# Patient Record
Sex: MALE | Race: WHITE | Employment: FULL TIME
[De-identification: names, ages, dates, MRNs, and addresses within clinical notes are randomized per-mention and may not be internally consistent; named-entity substitution may affect disease eponyms.]

---

## 2018-01-26 ENCOUNTER — TELEPHONE (OUTPATIENT)
Dept: CASE MANAGEMENT | Age: 53
End: 2018-01-26

## 2022-02-24 ENCOUNTER — OFFICE VISIT (OUTPATIENT)
Dept: FAMILY MEDICINE CLINIC | Age: 57
End: 2022-02-24
Payer: MEDICARE

## 2022-02-24 VITALS
SYSTOLIC BLOOD PRESSURE: 118 MMHG | DIASTOLIC BLOOD PRESSURE: 78 MMHG | OXYGEN SATURATION: 97 % | WEIGHT: 249 LBS | HEIGHT: 73 IN | BODY MASS INDEX: 33 KG/M2 | HEART RATE: 75 BPM

## 2022-02-24 DIAGNOSIS — Z79.899 MEDICAL MARIJUANA USE: ICD-10-CM

## 2022-02-24 DIAGNOSIS — Z13.220 SCREENING, LIPID: ICD-10-CM

## 2022-02-24 DIAGNOSIS — Z13.29 SCREENING FOR THYROID DISORDER: ICD-10-CM

## 2022-02-24 DIAGNOSIS — Z91.14 CONTROLLED SUBSTANCE AGREEMENT BROKEN: ICD-10-CM

## 2022-02-24 DIAGNOSIS — E78.5 HYPERLIPIDEMIA, UNSPECIFIED HYPERLIPIDEMIA TYPE: ICD-10-CM

## 2022-02-24 DIAGNOSIS — Z76.89 ENCOUNTER TO ESTABLISH CARE: Primary | ICD-10-CM

## 2022-02-24 DIAGNOSIS — J44.9 CHRONIC OBSTRUCTIVE PULMONARY DISEASE, UNSPECIFIED COPD TYPE (HCC): ICD-10-CM

## 2022-02-24 DIAGNOSIS — C34.12 PRIMARY CANCER OF LEFT UPPER LOBE OF LUNG (HCC): ICD-10-CM

## 2022-02-24 DIAGNOSIS — F41.8 MIXED ANXIETY AND DEPRESSIVE DISORDER: ICD-10-CM

## 2022-02-24 DIAGNOSIS — R35.1 NOCTURIA: ICD-10-CM

## 2022-02-24 DIAGNOSIS — Z12.11 ENCOUNTER FOR SCREENING FOR MALIGNANT NEOPLASM OF COLON: ICD-10-CM

## 2022-02-24 DIAGNOSIS — I10 ESSENTIAL HYPERTENSION: ICD-10-CM

## 2022-02-24 DIAGNOSIS — C34.32 PRIMARY MALIGNANT NEOPLASM OF LEFT LOWER LOBE OF LUNG (HCC): ICD-10-CM

## 2022-02-24 PROBLEM — R06.02 SOB (SHORTNESS OF BREATH): Status: ACTIVE | Noted: 2017-12-11

## 2022-02-24 PROBLEM — R06.83 SNORING: Status: ACTIVE | Noted: 2020-07-08

## 2022-02-24 PROBLEM — D49.1 NEOPLASM OF LUNG: Status: ACTIVE | Noted: 2020-03-13

## 2022-02-24 PROBLEM — D75.838 SECONDARY THROMBOCYTOSIS: Status: ACTIVE | Noted: 2017-04-20

## 2022-02-24 PROBLEM — R07.2 PRECORDIAL PAIN: Status: ACTIVE | Noted: 2017-12-11

## 2022-02-24 PROBLEM — R53.83 FATIGUE: Status: ACTIVE | Noted: 2020-07-08

## 2022-02-24 PROCEDURE — 3023F SPIROM DOC REV: CPT | Performed by: NURSE PRACTITIONER

## 2022-02-24 PROCEDURE — 4004F PT TOBACCO SCREEN RCVD TLK: CPT | Performed by: NURSE PRACTITIONER

## 2022-02-24 PROCEDURE — 99204 OFFICE O/P NEW MOD 45 MIN: CPT | Performed by: NURSE PRACTITIONER

## 2022-02-24 PROCEDURE — G8427 DOCREV CUR MEDS BY ELIG CLIN: HCPCS | Performed by: NURSE PRACTITIONER

## 2022-02-24 PROCEDURE — 3017F COLORECTAL CA SCREEN DOC REV: CPT | Performed by: NURSE PRACTITIONER

## 2022-02-24 PROCEDURE — G8417 CALC BMI ABV UP PARAM F/U: HCPCS | Performed by: NURSE PRACTITIONER

## 2022-02-24 PROCEDURE — G8482 FLU IMMUNIZE ORDER/ADMIN: HCPCS | Performed by: NURSE PRACTITIONER

## 2022-02-24 RX ORDER — AMLODIPINE BESYLATE 10 MG/1
10 TABLET ORAL DAILY
Qty: 90 TABLET | Refills: 0 | Status: SHIPPED | OUTPATIENT
Start: 2022-02-24 | End: 2022-05-02

## 2022-02-24 RX ORDER — DIAZEPAM 10 MG/1
10 TABLET ORAL EVERY 6 HOURS PRN
COMMUNITY

## 2022-02-24 SDOH — ECONOMIC STABILITY: FOOD INSECURITY: WITHIN THE PAST 12 MONTHS, YOU WORRIED THAT YOUR FOOD WOULD RUN OUT BEFORE YOU GOT MONEY TO BUY MORE.: SOMETIMES TRUE

## 2022-02-24 SDOH — ECONOMIC STABILITY: FOOD INSECURITY: WITHIN THE PAST 12 MONTHS, THE FOOD YOU BOUGHT JUST DIDN'T LAST AND YOU DIDN'T HAVE MONEY TO GET MORE.: NEVER TRUE

## 2022-02-24 ASSESSMENT — PATIENT HEALTH QUESTIONNAIRE - PHQ9
SUM OF ALL RESPONSES TO PHQ QUESTIONS 1-9: 10
7. TROUBLE CONCENTRATING ON THINGS, SUCH AS READING THE NEWSPAPER OR WATCHING TELEVISION: 0
3. TROUBLE FALLING OR STAYING ASLEEP: 2
1. LITTLE INTEREST OR PLEASURE IN DOING THINGS: 3
8. MOVING OR SPEAKING SO SLOWLY THAT OTHER PEOPLE COULD HAVE NOTICED. OR THE OPPOSITE, BEING SO FIGETY OR RESTLESS THAT YOU HAVE BEEN MOVING AROUND A LOT MORE THAN USUAL: 0
SUM OF ALL RESPONSES TO PHQ QUESTIONS 1-9: 10
6. FEELING BAD ABOUT YOURSELF - OR THAT YOU ARE A FAILURE OR HAVE LET YOURSELF OR YOUR FAMILY DOWN: 0
SUM OF ALL RESPONSES TO PHQ9 QUESTIONS 1 & 2: 5
SUM OF ALL RESPONSES TO PHQ QUESTIONS 1-9: 10
2. FEELING DOWN, DEPRESSED OR HOPELESS: 2
SUM OF ALL RESPONSES TO PHQ QUESTIONS 1-9: 10
4. FEELING TIRED OR HAVING LITTLE ENERGY: 2
10. IF YOU CHECKED OFF ANY PROBLEMS, HOW DIFFICULT HAVE THESE PROBLEMS MADE IT FOR YOU TO DO YOUR WORK, TAKE CARE OF THINGS AT HOME, OR GET ALONG WITH OTHER PEOPLE: 1
5. POOR APPETITE OR OVEREATING: 1
9. THOUGHTS THAT YOU WOULD BE BETTER OFF DEAD, OR OF HURTING YOURSELF: 0

## 2022-02-24 ASSESSMENT — ENCOUNTER SYMPTOMS
PHOTOPHOBIA: 0
CONSTIPATION: 0
COLOR CHANGE: 0
VOMITING: 0
TROUBLE SWALLOWING: 0
SORE THROAT: 0
CHEST TIGHTNESS: 0
CHOKING: 0
COUGH: 1
EYE REDNESS: 0
SHORTNESS OF BREATH: 0
STRIDOR: 0
DIARRHEA: 0
VOICE CHANGE: 0
RHINORRHEA: 0
WHEEZING: 0
EYE PAIN: 0
BACK PAIN: 0
SINUS PAIN: 0
NAUSEA: 0
BLOOD IN STOOL: 0
SINUS PRESSURE: 0
EYE ITCHING: 0
ABDOMINAL PAIN: 0
EYE DISCHARGE: 0

## 2022-02-24 ASSESSMENT — ANXIETY QUESTIONNAIRES
6. BECOMING EASILY ANNOYED OR IRRITABLE: 3
3. WORRYING TOO MUCH ABOUT DIFFERENT THINGS: 2
4. TROUBLE RELAXING: 2
IF YOU CHECKED OFF ANY PROBLEMS ON THIS QUESTIONNAIRE, HOW DIFFICULT HAVE THESE PROBLEMS MADE IT FOR YOU TO DO YOUR WORK, TAKE CARE OF THINGS AT HOME, OR GET ALONG WITH OTHER PEOPLE: SOMEWHAT DIFFICULT
7. FEELING AFRAID AS IF SOMETHING AWFUL MIGHT HAPPEN: 1
1. FEELING NERVOUS, ANXIOUS, OR ON EDGE: 3
GAD7 TOTAL SCORE: 14
5. BEING SO RESTLESS THAT IT IS HARD TO SIT STILL: 1
2. NOT BEING ABLE TO STOP OR CONTROL WORRYING: 2

## 2022-02-24 ASSESSMENT — SOCIAL DETERMINANTS OF HEALTH (SDOH): HOW HARD IS IT FOR YOU TO PAY FOR THE VERY BASICS LIKE FOOD, HOUSING, MEDICAL CARE, AND HEATING?: SOMEWHAT HARD
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
## 2022-02-25 RX ORDER — ALBUTEROL SULFATE 90 UG/1
2 AEROSOL, METERED RESPIRATORY (INHALATION) EVERY 6 HOURS PRN
Qty: 4 EACH | Refills: 0 | Status: SHIPPED | OUTPATIENT
Start: 2022-02-25 | End: 2022-05-02

## 2022-02-25 RX ORDER — DIAZEPAM 10 MG/1
10 TABLET ORAL EVERY 6 HOURS PRN
OUTPATIENT
Start: 2022-02-25

## 2022-02-25 NOTE — TELEPHONE ENCOUNTER
Called [facility] previous [facility], Felicita Hammond. THey have no seen him since July 2020 but they will fax over her LOV note. Called patient to get the name of the oncologist he is seeing, patient doesn't remember the name but he did say he hasn't seen the physician in awhile because he had covid.

## 2022-02-28 ENCOUNTER — TELEPHONE (OUTPATIENT)
Dept: FAMILY MEDICINE CLINIC | Age: 57
End: 2022-02-28

## 2022-02-28 NOTE — TELEPHONE ENCOUNTER
----- Message from PO Conn CNP sent at 2/25/2022  1:10 PM EST -----  Upon digging some into his chart, he has not had valium filled for over a year by his prior PCP Dhaval Alberto NP. Can we call to get her note faxed? I also reviewed oncology not and he has not been seen there since 2020. Can we call pt to see where he is getting treatment and get that most recent note as well? Denied Valium refill, not appropriate at this time, if he would like to discuss he can speak with his oncologist due to him taking this medication for his \"treatment per pt\" .     ----- Message -----  From: Kvng John MA  Sent: 2/25/2022  12:00 PM EST  To: PO Conn CNP    Records received. Pcp reviewing.   ----- Message -----  From: PO Conn CNP  Sent: 2/25/2022  11:53 AM EST  To: [facility] [id number]    Please call to get most recent notes from old PCP and current oncologist. I believe tiffanie HEARD called yesterday. I need these notes today to determine one of his medications. There is a chance of potential withdraw.  Needs addressed ASAP

## 2022-03-01 ENCOUNTER — TELEPHONE (OUTPATIENT)
Dept: FAMILY MEDICINE CLINIC | Age: 57
End: 2022-03-01

## 2022-03-01 NOTE — TELEPHONE ENCOUNTER
----- Message from PO Baugh CNP sent at 3/1/2022 11:39 AM EST -----  I have the PCP note and oncology note from 2020, we reached out to pt to see who he is seeing for radiation, he stated he is currently getting this treatment but he was unable to tell us. He said he would call back , but we have not heard back in a few days. If we can reach out again to get the name of the Dr. Sully Lees is currently treating him for his cancer and then call to get the most recent note.  ----- Message -----  From: Diamante Gay MA  Sent: 3/1/2022  11:34 AM EST  To: PO Baugh CNP    Was this addressed?   ----- Message -----  From: PO Baugh CNP  Sent: 2/25/2022   1:13 PM EST  To: [facility] [address]    Upon digging some into his chart, he has not had valium filled for over a year by his prior PCP Governor Rasheed PENALOZA. Can we call to get her note faxed? I also reviewed oncology not and he has not been seen there since 2020. Can we call pt to see where he is getting treatment and get that most recent note as well? Denied Valium refill, not appropriate at this time, if he would like to discuss he can speak with his oncologist due to him taking this medication for his \"treatment per pt\" .     ----- Message -----  From: Addis Bolton MA  Sent: 2/25/2022  12:00 PM EST  To: PO Baugh CNP    Records received. Pcp reviewing.   ----- Message -----  From: PO Baugh CNP  Sent: 2/25/2022  11:53 AM EST  To: [facility] [address]    Please call to get most recent notes from old PCP and current oncologist. I believe tiffanie HEARD called yesterday. I need these notes today to determine one of his medications. There is a chance of potential withdraw.  Needs addressed ASAP

## 2022-03-01 NOTE — TELEPHONE ENCOUNTER
Tried calling patient to confirm where is he getting treatment for his cancer so we can call to get his last notes could not leave message due to mailbox being full

## 2022-03-24 ENCOUNTER — TELEPHONE (OUTPATIENT)
Dept: FAMILY MEDICINE CLINIC | Age: 57
End: 2022-03-24

## 2022-03-31 ENCOUNTER — TELEPHONE (OUTPATIENT)
Dept: FAMILY MEDICINE CLINIC | Age: 57
End: 2022-03-31

## 2022-04-06 ENCOUNTER — TELEPHONE (OUTPATIENT)
Dept: FAMILY MEDICINE CLINIC | Age: 57
End: 2022-04-06

## 2022-04-06 NOTE — TELEPHONE ENCOUNTER
Left message to see if patient will do annual office visit with me on the phone. If calls back okay to schedule anywhere and do it right away.

## 2022-05-02 RX ORDER — ALBUTEROL SULFATE 90 UG/1
AEROSOL, METERED RESPIRATORY (INHALATION)
Qty: 54 G | Refills: 0 | Status: SHIPPED | OUTPATIENT
Start: 2022-05-02 | End: 2022-07-11

## 2022-05-02 RX ORDER — AMLODIPINE BESYLATE 10 MG/1
TABLET ORAL
Qty: 90 TABLET | Refills: 0 | Status: SHIPPED | OUTPATIENT
Start: 2022-05-02 | End: 2022-07-11

## 2022-05-02 NOTE — TELEPHONE ENCOUNTER
Medication:   Requested Prescriptions     Pending Prescriptions Disp Refills    albuterol sulfate  (90 Base) MCG/ACT inhaler [Pharmacy Med Name: ALBUTEROL HFA 90 MCG INHALER] 54 g      Sig: INHALE TWO PUFFS BY MOUTH EVERY 6 HOURS AS NEEDED FOR WHEEZING    amLODIPine (NORVASC) 10 MG tablet [Pharmacy Med Name: amLODIPine BESYLATE 10 MG TAB] 90 tablet 0     Sig: TAKE ONE TABLET BY MOUTH DAILY        Last Filled:      Patient Phone Number: [phone] (home)     Last appt: 2/24/2022   Next appt: Visit date not found    Last OARRS:   RX Monitoring 2/25/2022   Periodic Controlled Substance Monitoring Possible medication side effects, risk of tolerance/dependence & alternative treatments discussed. Chronic Pain > 50 MEDD Re-evaluated the status of the patient's underlying condition causing pain.

## 2022-05-24 NOTE — TELEPHONE ENCOUNTER
Medication:   Requested Prescriptions     Pending Prescriptions Disp Refills    sertraline (ZOLOFT) 50 MG tablet [Pharmacy Med Name: SERTRALINE HCL 50 MG TABLET] 270 tablet 0     Sig: TAKE THREE TABLETS BY MOUTH DAILY        Last Filled:  5/25/22    Patient Phone Number: [phone] (home)     Last appt: 2/24/2022   Next appt: Visit date not found    Last OARRS:   RX Monitoring 2/25/2022   Periodic Controlled Substance Monitoring Possible medication side effects, risk of tolerance/dependence & alternative treatments discussed. Chronic Pain > 50 MEDD Re-evaluated the status of the patient's underlying condition causing pain.

## 2022-06-08 ENCOUNTER — TELEPHONE (OUTPATIENT)
Dept: FAMILY MEDICINE CLINIC | Age: 57
End: 2022-06-08

## 2022-06-08 NOTE — TELEPHONE ENCOUNTER
Tried to call patient to set him up for his AWV, diabetes and colonoscopy schedule.  Voicemail full unable to leave message

## 2022-07-11 RX ORDER — ALBUTEROL SULFATE 90 UG/1
AEROSOL, METERED RESPIRATORY (INHALATION)
Qty: 54 G | Refills: 0 | Status: SHIPPED | OUTPATIENT
Start: 2022-07-11 | End: 2022-09-06

## 2022-07-11 RX ORDER — AMLODIPINE BESYLATE 10 MG/1
TABLET ORAL
Qty: 90 TABLET | Refills: 0 | Status: SHIPPED | OUTPATIENT
Start: 2022-07-11 | End: 2022-11-01

## 2022-07-11 NOTE — TELEPHONE ENCOUNTER
Medication:   Requested Prescriptions     Pending Prescriptions Disp Refills    albuterol sulfate HFA (PROVENTIL;VENTOLIN;PROAIR) 108 (90 Base) MCG/ACT inhaler [Pharmacy Med Name: ALBUTEROL HFA 90 MCG INHALER] 54 g 0     Sig: INHALE TWO PUFFS BY MOUTH EVERY 6 HOURS AS NEEDED FOR WHEEZING    amLODIPine (NORVASC) 10 MG tablet [Pharmacy Med Name: amLODIPine BESYLATE 10 MG TAB] 90 tablet 0     Sig: TAKE ONE TABLET BY MOUTH DAILY        Last Filled:  05/2/2022    Patient Phone Number: [phone] (home)     Last appt: 2/24/2022   Next appt: Visit date not found    Last OARRS:   RX Monitoring 2/25/2022   Periodic Controlled Substance Monitoring Possible medication side effects, risk of tolerance/dependence & alternative treatments discussed. Chronic Pain > 50 MEDD Re-evaluated the status of the patient's underlying condition causing pain.

## 2022-07-11 NOTE — TELEPHONE ENCOUNTER
Tried to call patient to let him know he is due for a follow-up appointment and blood work unable to leave message due to mail box being full

## 2022-07-13 NOTE — TELEPHONE ENCOUNTER
Mailbox full. Patient presented today for treatment of warts on left foot and hand with lizzy CARLSON. Patient notified this medicine will produce a blister over the area that it was applied on.  These blister can get as large as an inch.  Medication should be washed off in  4-6 hours or sooner if it becomes painful.  The area should be cleansed, soaking in a lukewarm bath is best.  A sterile needle should be used to pop the blisters as this will help alleviate the discomfort.  After the blister has popped, vaseline or and antibiotic ointment should be applied twice a day for one week

## 2022-09-06 RX ORDER — ALBUTEROL SULFATE 90 UG/1
AEROSOL, METERED RESPIRATORY (INHALATION)
Qty: 54 G | Refills: 0 | Status: SHIPPED | OUTPATIENT
Start: 2022-09-06

## 2022-09-06 NOTE — TELEPHONE ENCOUNTER
Medication:   Requested Prescriptions     Pending Prescriptions Disp Refills    albuterol sulfate HFA (PROVENTIL;VENTOLIN;PROAIR) 108 (90 Base) MCG/ACT inhaler [Pharmacy Med Name: ALBUTEROL HFA 90 MCG INHALER] 54 g 0     Sig: INHALE TWO PUFFS BY MOUTH EVERY 6 HOURS AS NEEDED FOR WHEEZING        Last Filled:  7/11/22    Patient Phone Number: [phone] (home)     Last appt: 2/24/2022   Next appt: Visit date not found    Last OARRS:   RX Monitoring 2/25/2022   Periodic Controlled Substance Monitoring Possible medication side effects, risk of tolerance/dependence & alternative treatments discussed. Chronic Pain > 50 MEDD Re-evaluated the status of the patient's underlying condition causing pain.

## 2022-10-14 ENCOUNTER — TELEPHONE (OUTPATIENT)
Dept: PRIMARY CARE CLINIC | Age: 57
End: 2022-10-14

## 2022-11-01 RX ORDER — AMLODIPINE BESYLATE 10 MG/1
TABLET ORAL
Qty: 90 TABLET | Refills: 0 | Status: SHIPPED | OUTPATIENT
Start: 2022-11-01

## 2022-11-01 NOTE — TELEPHONE ENCOUNTER
Medication:   Requested Prescriptions     Pending Prescriptions Disp Refills    amLODIPine (NORVASC) 10 MG tablet [Pharmacy Med Name: amLODIPine BESYLATE 10 MG TAB] 90 tablet 0     Sig: TAKE ONE TABLET BY MOUTH DAILY        /Last Filled:  /    Patient Phone Number: [phone] (home)     Last appt: 2/24/2022   Next appt: Visit date not found    Last OARRS:   RX Monitoring 2/25/2022   Periodic Controlled Substance Monitoring Possible medication side effects, risk of tolerance/dependence & alternative treatments discussed. Chronic Pain > 50 MEDD Re-evaluated the status of the patient's underlying condition causing pain.

## 2023-01-27 RX ORDER — AMLODIPINE BESYLATE 10 MG/1
TABLET ORAL
Qty: 90 TABLET | Refills: 0 | Status: SHIPPED | OUTPATIENT
Start: 2023-01-27

## 2023-01-27 NOTE — TELEPHONE ENCOUNTER
Medication:   Requested Prescriptions     Pending Prescriptions Disp Refills    amLODIPine (NORVASC) 10 MG tablet [Pharmacy Med Name: amLODIPine BESYLATE 10 MG TAB] 90 tablet 0     Sig: TAKE ONE TABLET BY MOUTH DAILY        Last Filled:  11/1/22    Patient Phone Number: [phone] (home)     Last appt: 2/24/2022   Next appt: Visit date not found    Last OARRS:   RX Monitoring 2/25/2022   Periodic Controlled Substance Monitoring Possible medication side effects, risk of tolerance/dependence & alternative treatments discussed. Chronic Pain > 50 MEDD Re-evaluated the status of the patient's underlying condition causing pain.

## 2024-11-22 ENCOUNTER — OFFICE VISIT (OUTPATIENT)
Dept: PRIMARY CARE CLINIC | Age: 59
End: 2024-11-22
Payer: MEDICARE

## 2024-11-22 VITALS
SYSTOLIC BLOOD PRESSURE: 116 MMHG | DIASTOLIC BLOOD PRESSURE: 74 MMHG | TEMPERATURE: 98.1 F | OXYGEN SATURATION: 94 % | RESPIRATION RATE: 19 BRPM | HEIGHT: 73 IN | HEART RATE: 80 BPM | BODY MASS INDEX: 34.19 KG/M2 | WEIGHT: 258 LBS

## 2024-11-22 DIAGNOSIS — Z00.00 HEALTHCARE MAINTENANCE: Primary | ICD-10-CM

## 2024-11-22 DIAGNOSIS — Z11.4 ENCOUNTER FOR SCREENING FOR HIV: ICD-10-CM

## 2024-11-22 DIAGNOSIS — C34.12 PRIMARY CANCER OF LEFT UPPER LOBE OF LUNG (HCC): ICD-10-CM

## 2024-11-22 DIAGNOSIS — Z11.59 ENCOUNTER FOR HEPATITIS C SCREENING TEST FOR LOW RISK PATIENT: ICD-10-CM

## 2024-11-22 DIAGNOSIS — J44.9 CHRONIC OBSTRUCTIVE PULMONARY DISEASE, UNSPECIFIED COPD TYPE (HCC): ICD-10-CM

## 2024-11-22 DIAGNOSIS — Z12.5 ENCOUNTER FOR SCREENING PROSTATE SPECIFIC ANTIGEN (PSA) MEASUREMENT: ICD-10-CM

## 2024-11-22 DIAGNOSIS — R73.09 ELEVATED GLUCOSE: ICD-10-CM

## 2024-11-22 DIAGNOSIS — C34.32 PRIMARY MALIGNANT NEOPLASM OF LEFT LOWER LOBE OF LUNG (HCC): ICD-10-CM

## 2024-11-22 DIAGNOSIS — E78.5 HYPERLIPIDEMIA, UNSPECIFIED HYPERLIPIDEMIA TYPE: ICD-10-CM

## 2024-11-22 PROBLEM — R06.02 SOB (SHORTNESS OF BREATH): Status: RESOLVED | Noted: 2017-12-11 | Resolved: 2024-11-22

## 2024-11-22 PROCEDURE — 3078F DIAST BP <80 MM HG: CPT | Performed by: FAMILY MEDICINE

## 2024-11-22 PROCEDURE — G0438 PPPS, INITIAL VISIT: HCPCS | Performed by: FAMILY MEDICINE

## 2024-11-22 PROCEDURE — 3074F SYST BP LT 130 MM HG: CPT | Performed by: FAMILY MEDICINE

## 2024-11-22 RX ORDER — ASPIRIN 81 MG/1
81 TABLET ORAL DAILY
COMMUNITY

## 2024-11-22 RX ORDER — FLUTICASONE PROPIONATE AND SALMETEROL 250; 50 UG/1; UG/1
1 POWDER RESPIRATORY (INHALATION) EVERY 12 HOURS
Qty: 60 EACH | Refills: 3 | Status: SHIPPED | OUTPATIENT
Start: 2024-11-22

## 2024-11-22 RX ORDER — SIMVASTATIN 20 MG
20 TABLET ORAL NIGHTLY
Qty: 30 TABLET | Refills: 0 | Status: SHIPPED | OUTPATIENT
Start: 2024-11-22

## 2024-11-22 SDOH — ECONOMIC STABILITY: FOOD INSECURITY: WITHIN THE PAST 12 MONTHS, YOU WORRIED THAT YOUR FOOD WOULD RUN OUT BEFORE YOU GOT MONEY TO BUY MORE.: NEVER TRUE

## 2024-11-22 SDOH — ECONOMIC STABILITY: FOOD INSECURITY: WITHIN THE PAST 12 MONTHS, THE FOOD YOU BOUGHT JUST DIDN'T LAST AND YOU DIDN'T HAVE MONEY TO GET MORE.: NEVER TRUE

## 2024-11-22 SDOH — ECONOMIC STABILITY: INCOME INSECURITY: HOW HARD IS IT FOR YOU TO PAY FOR THE VERY BASICS LIKE FOOD, HOUSING, MEDICAL CARE, AND HEATING?: NOT HARD AT ALL

## 2024-11-22 ASSESSMENT — PATIENT HEALTH QUESTIONNAIRE - PHQ9
2. FEELING DOWN, DEPRESSED OR HOPELESS: SEVERAL DAYS
10. IF YOU CHECKED OFF ANY PROBLEMS, HOW DIFFICULT HAVE THESE PROBLEMS MADE IT FOR YOU TO DO YOUR WORK, TAKE CARE OF THINGS AT HOME, OR GET ALONG WITH OTHER PEOPLE: NOT DIFFICULT AT ALL
1. LITTLE INTEREST OR PLEASURE IN DOING THINGS: SEVERAL DAYS
5. POOR APPETITE OR OVEREATING: NOT AT ALL
SUM OF ALL RESPONSES TO PHQ QUESTIONS 1-9: 3
SUM OF ALL RESPONSES TO PHQ QUESTIONS 1-9: 3
3. TROUBLE FALLING OR STAYING ASLEEP: NOT AT ALL
9. THOUGHTS THAT YOU WOULD BE BETTER OFF DEAD, OR OF HURTING YOURSELF: NOT AT ALL
7. TROUBLE CONCENTRATING ON THINGS, SUCH AS READING THE NEWSPAPER OR WATCHING TELEVISION: NOT AT ALL
6. FEELING BAD ABOUT YOURSELF - OR THAT YOU ARE A FAILURE OR HAVE LET YOURSELF OR YOUR FAMILY DOWN: NOT AT ALL
SUM OF ALL RESPONSES TO PHQ9 QUESTIONS 1 & 2: 2
4. FEELING TIRED OR HAVING LITTLE ENERGY: SEVERAL DAYS
SUM OF ALL RESPONSES TO PHQ QUESTIONS 1-9: 3
SUM OF ALL RESPONSES TO PHQ QUESTIONS 1-9: 3
8. MOVING OR SPEAKING SO SLOWLY THAT OTHER PEOPLE COULD HAVE NOTICED. OR THE OPPOSITE, BEING SO FIGETY OR RESTLESS THAT YOU HAVE BEEN MOVING AROUND A LOT MORE THAN USUAL: NOT AT ALL

## 2024-11-22 ASSESSMENT — ENCOUNTER SYMPTOMS
SHORTNESS OF BREATH: 0
VOMITING: 0
RHINORRHEA: 0
NAUSEA: 0
COUGH: 0
BLOOD IN STOOL: 0
DIARRHEA: 0

## 2024-11-22 ASSESSMENT — LIFESTYLE VARIABLES
HOW OFTEN DO YOU HAVE A DRINK CONTAINING ALCOHOL: NEVER
HOW MANY STANDARD DRINKS CONTAINING ALCOHOL DO YOU HAVE ON A TYPICAL DAY: PATIENT DOES NOT DRINK

## 2024-11-22 NOTE — PATIENT INSTRUCTIONS
For help and support with [employer] christina/portal set-up, please call [phone].     Please find our [facility] Location Guide below for your convenience!    CENTRAL LOCATIONS    1) [facility]  [address]  [place], [place] [address]  Phone: [phone]    2) [facility]  [address], [place] [address]  Phone: [phone]    [facility] LOCATIONS    3) [facility]  [address]  [place], [place] [address]  Phone: [phone]    4) [facility]  [address] [place] [address]  Phone: [phone]    5) [facility]  [address] [place] [address]  Phone: [phone]    6) [facility]  [address]  [place] [address]   Phone: [phone]    [place] LOCATIONS    7) [facility]  [address]  [place] [address]  Phone: [phone]    8) [facility]  [address] [place] [address]  Phone: [phone]    9) [facility]  [address] [place] [address]  Phone: [phone]    10) [facility]  [address]  [place] [address]  Phone: [phone]    [place] LOCATIONS    11) [facility]  [address]  [place] [address]  Phone: [phone]    12) [facility]  [address]  [place] [address]  Phone: [phone]    [facility] LOCATIONS    13) [facility]  [address]  [place] [address]  Phone: [phone]    14) [facility]  [address] [place] [address]  Phone: [phone]     Reminder: Please call to schedule dental exam when able.  Stevia as the sweetener for soda: Zevia or Ollipop  Water: 64 ounces per day    Below are some resources for Mental Health.  Before scheduling an appointment, please ensure that the office is

## 2024-11-22 NOTE — PROGRESS NOTES
information>   Discussed advance directive with the patient today and given brochure    Annual Wellness follow up to be scheduled and counseling as noted below.    Review of Systems:  Review of Systems   Constitutional:  Negative for chills, diaphoresis and fever.   HENT:  Negative for congestion and rhinorrhea.    Respiratory:  Negative for cough and shortness of breath.    Cardiovascular:  Negative for chest pain.   Gastrointestinal:  Negative for blood in stool, diarrhea, nausea and vomiting.   Genitourinary:  Negative for difficulty urinating and hematuria.   Skin:  Negative for rash.   Neurological:  Negative for dizziness and syncope.   Psychiatric/Behavioral:  Negative for dysphoric mood.        Objective:    Physical Exam:  Vitals:    11/22/24 0950   BP: 116/74   Site: Right Upper Arm   Position: Sitting   Cuff Size: Large Adult   Pulse: 80   Resp: 19   Temp: 98.1 °F (36.7 °C)   TempSrc: Oral   SpO2: 94%   Weight: 117 kg (258 lb)   Height: 1.854 m (6' 0.99\")     Physical Exam  Constitutional:       General: He is not in acute distress.     Appearance: He is not ill-appearing, toxic-appearing or diaphoretic.   HENT:      Head: Normocephalic and atraumatic.      Right Ear: External ear normal.      Left Ear: External ear normal.   Eyes:      General: No scleral icterus.  Cardiovascular:      Rate and Rhythm: Normal rate and regular rhythm.      Heart sounds: Normal heart sounds. No murmur heard.     No friction rub. No gallop.   Pulmonary:      Effort: No respiratory distress.      Breath sounds: No stridor. No wheezing, rhonchi or rales.   Chest:      Chest wall: No tenderness.   Skin:     General: Skin is warm and dry.      Capillary Refill: Capillary refill takes less than 2 seconds.   Neurological:      Mental Status: He is alert.   Psychiatric:         Mood and Affect: Mood normal.         Behavior: Behavior normal.       Body mass index is 34.05 kg/m².    Labs:  Recent Results (from the past 672 hour(s))

## 2024-11-23 PROBLEM — D75.838 SECONDARY THROMBOCYTOSIS: Status: RESOLVED | Noted: 2017-04-20 | Resolved: 2024-11-23

## 2024-11-23 PROBLEM — R06.83 SNORING: Status: RESOLVED | Noted: 2020-07-08 | Resolved: 2024-11-23

## 2024-11-23 NOTE — ASSESSMENT & PLAN NOTE
Overall doing okay. Reminded to schedule dental exam when able. Age appropriate screenings provided as per orders below. Provided mental health resources as well to establish care and since I do not prescribe long term benzodiazepines - he was amenable to this and understood. Other problems addressed today as otherwise noted.

## 2024-11-23 NOTE — ASSESSMENT & PLAN NOTE
Poorly controlled and in setting of lung CA with current tobacco use. Will add controlled medication to his regimen (with a copy to his oncologist as ANN-MARIE). He declined additional referral for pulm and prefers to follow with onc for his lungs. He does not plan to quit tobacco - reports it is one of his only remaining joys. 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

## 2024-11-23 NOTE — ASSESSMENT & PLAN NOTE
Unclear level of control, he reports Lipitor gave him nightmares. Will trial simvastatin. Lipids to eval control as well.

## 2024-12-19 DIAGNOSIS — E78.5 HYPERLIPIDEMIA, UNSPECIFIED HYPERLIPIDEMIA TYPE: ICD-10-CM

## 2024-12-19 RX ORDER — SIMVASTATIN 20 MG
20 TABLET ORAL NIGHTLY
Qty: 30 TABLET | Refills: 0 | Status: SHIPPED | OUTPATIENT
Start: 2024-12-19

## 2024-12-19 NOTE — TELEPHONE ENCOUNTER
Spoke with patient, advised I would fill 30 day supply. Needs labs for additional fills. He can get this done, fasting 8 hours, water encouraged with no appt. He was amenable to this.

## 2024-12-22 PROBLEM — Z11.59 ENCOUNTER FOR HEPATITIS C SCREENING TEST FOR LOW RISK PATIENT: Status: RESOLVED | Noted: 2024-11-22 | Resolved: 2024-12-22

## 2024-12-22 PROBLEM — Z11.4 ENCOUNTER FOR SCREENING FOR HIV: Status: RESOLVED | Noted: 2024-11-22 | Resolved: 2024-12-22

## 2024-12-22 PROBLEM — Z00.00 HEALTHCARE MAINTENANCE: Status: RESOLVED | Noted: 2024-11-22 | Resolved: 2024-12-22

## 2025-01-19 DIAGNOSIS — E78.5 HYPERLIPIDEMIA, UNSPECIFIED HYPERLIPIDEMIA TYPE: ICD-10-CM

## 2025-01-22 RX ORDER — SIMVASTATIN 20 MG
20 TABLET ORAL NIGHTLY
Qty: 30 TABLET | Refills: 0 | Status: SHIPPED | OUTPATIENT
Start: 2025-01-22

## 2025-03-19 DIAGNOSIS — J44.9 CHRONIC OBSTRUCTIVE PULMONARY DISEASE, UNSPECIFIED COPD TYPE (HCC): ICD-10-CM

## 2025-03-19 RX ORDER — FLUTICASONE PROPIONATE AND SALMETEROL 250; 50 UG/1; UG/1
POWDER RESPIRATORY (INHALATION)
Qty: 60 EACH | Refills: 3 | OUTPATIENT
Start: 2025-03-19